# Patient Record
Sex: MALE | Race: AMERICAN INDIAN OR ALASKA NATIVE
[De-identification: names, ages, dates, MRNs, and addresses within clinical notes are randomized per-mention and may not be internally consistent; named-entity substitution may affect disease eponyms.]

---

## 2018-09-01 ENCOUNTER — HOSPITAL ENCOUNTER (EMERGENCY)
Dept: HOSPITAL 5 - ED | Age: 63
Discharge: HOME | End: 2018-09-01
Payer: COMMERCIAL

## 2018-09-01 VITALS — SYSTOLIC BLOOD PRESSURE: 126 MMHG | DIASTOLIC BLOOD PRESSURE: 63 MMHG

## 2018-09-01 DIAGNOSIS — B34.9: Primary | ICD-10-CM

## 2018-09-01 LAB
ALBUMIN SERPL-MCNC: 3.9 G/DL (ref 3.9–5)
ALT SERPL-CCNC: 15 UNITS/L (ref 7–56)
BASOPHILS # (AUTO): 0 K/MM3 (ref 0–0.1)
BASOPHILS NFR BLD AUTO: 0.3 % (ref 0–1.8)
BILIRUB UR QL STRIP: (no result)
BLOOD UR QL VISUAL: (no result)
BUN SERPL-MCNC: 9 MG/DL (ref 9–20)
BUN/CREAT SERPL: 10 %
CALCIUM SERPL-MCNC: 8.8 MG/DL (ref 8.4–10.2)
EOSINOPHIL # BLD AUTO: 0.2 K/MM3 (ref 0–0.4)
EOSINOPHIL NFR BLD AUTO: 2.3 % (ref 0–4.3)
HCT VFR BLD CALC: 40.8 % (ref 35.5–45.6)
HEMOLYSIS INDEX: 7
HGB BLD-MCNC: 13.3 GM/DL (ref 11.8–15.2)
LYMPHOCYTES # BLD AUTO: 0.8 K/MM3 (ref 1.2–5.4)
LYMPHOCYTES NFR BLD AUTO: 9.8 % (ref 13.4–35)
MCH RBC QN AUTO: 25 PG (ref 28–32)
MCHC RBC AUTO-ENTMCNC: 33 % (ref 32–34)
MCV RBC AUTO: 76 FL (ref 84–94)
MONOCYTES # (AUTO): 0.9 K/MM3 (ref 0–0.8)
MONOCYTES % (AUTO): 11.3 % (ref 0–7.3)
MUCOUS THREADS #/AREA URNS HPF: (no result) /HPF
PH UR STRIP: 5 [PH] (ref 5–7)
PLATELET # BLD: 223 K/MM3 (ref 140–440)
PROT UR STRIP-MCNC: (no result) MG/DL
RBC # BLD AUTO: 5.39 M/MM3 (ref 3.65–5.03)
RBC #/AREA URNS HPF: 1 /HPF (ref 0–6)
UROBILINOGEN UR-MCNC: < 2 MG/DL (ref ?–2)
WBC #/AREA URNS HPF: 1 /HPF (ref 0–6)

## 2018-09-01 PROCEDURE — 99283 EMERGENCY DEPT VISIT LOW MDM: CPT

## 2018-09-01 PROCEDURE — 36415 COLL VENOUS BLD VENIPUNCTURE: CPT

## 2018-09-01 PROCEDURE — 80053 COMPREHEN METABOLIC PANEL: CPT

## 2018-09-01 PROCEDURE — 85025 COMPLETE CBC W/AUTO DIFF WBC: CPT

## 2018-09-01 PROCEDURE — 81001 URINALYSIS AUTO W/SCOPE: CPT

## 2018-09-01 NOTE — EMERGENCY DEPARTMENT REPORT
ED General Adult HPI





- General


Chief complaint: Pain General


Stated complaint: BODY ACHE


Time Seen by Provider: 09/01/18 09:25


Source: patient


Mode of arrival: Ambulatory


Limitations: No Limitations





- History of Present Illness


Initial comments: 





Patient is a 62-year-old gentleman from [place] who has been in the [place] 

[place] for the past 3 months who is complaining of generalized body aches and 

chills for the past 2 days.  Patient states pain is 8 out of 10 in severity and 

is achy all over.  Patient denies any fevers nausea vomiting cough dysuria 

congestion diarrhea.  Patient states he thinks he was bitten by mosquitoes 

several days ago but is not concerned about malaria.


Severity scale (0 -10): 0





- Related Data


 Previous Rx's











 Medication  Instructions  Recorded  Last Taken  Type


 


Ibuprofen [Motrin] 600 mg PO Q8H PRN #20 tablet 09/01/18 Unknown Rx


 


traMADol [Ultram] 50 mg PO Q6HR PRN #12 tablet 09/01/18 Unknown Rx











 Allergies











Allergy/AdvReac Type Severity Reaction Status Date / Time


 


No Known Allergies Allergy   Unverified 09/01/18 04:50














ED Review of Systems


ROS: 


Stated complaint: BODY ACHE


Other details as noted in HPI





Comment: All other systems reviewed and negative





ED Past Medical Hx





- Past Medical History


Previous Medical History?: No





- Surgical History


Past Surgical History?: No





- Social History


Smoking Status: Never Smoker


Substance Use Type: None





- Medications


Home Medications: 


 Home Medications











 Medication  Instructions  Recorded  Confirmed  Last Taken  Type


 


Ibuprofen [Motrin] 600 mg PO Q8H PRN #20 tablet 09/01/18  Unknown Rx


 


traMADol [Ultram] 50 mg PO Q6HR PRN #12 tablet 09/01/18  Unknown Rx














ED Physical Exam





- General


Limitations: Language Barrier (patient does speak some partial English as well 

as [place].  His daughter is fluent in English.)


General appearance: alert, in no apparent distress





- Head


Head exam: Present: atraumatic, normocephalic





- Eye


Eye exam: Present: normal appearance





- ENT


ENT exam: Present: mucous membranes moist





- Neck


Neck exam: Present: normal inspection





- Respiratory


Respiratory exam: Present: normal lung sounds bilaterally.  Absent: respiratory 

distress, wheezes, rales, rhonchi





- Cardiovascular


Cardiovascular Exam: Present: regular rate, normal rhythm.  Absent: systolic 

murmur, diastolic murmur, rubs, gallop





- GI/Abdominal


GI/Abdominal exam: Present: soft, normal bowel sounds.  Absent: distended, 

tenderness, guarding, rebound





- Rectal


Rectal exam: Present: deferred





- Extremities Exam


Extremities exam: Present: normal inspection





- Back Exam


Back exam: Present: normal inspection





- Neurological Exam


Neurological exam: Present: alert, oriented X3





- Psychiatric


Psychiatric exam: Present: normal affect, normal mood





- Skin


Skin exam: Present: warm, dry, intact, normal color.  Absent: rash





ED Course





 Vital Signs











  09/01/18





  04:40


 


Temperature 97.6 F


 


Pulse Rate 87


 


Respiratory 17





Rate 


 


Blood Pressure 126/63


 


O2 Sat by Pulse 99





Oximetry 














ED Medical Decision Making





- Lab Data


Result diagrams: 


 09/01/18 05:14





 09/01/18 05:14








 Lab Results











  09/01/18 09/01/18 09/01/18 Range/Units





  05:14 05:14 Unknown 


 


WBC  7.8    (4.5-11.0)  K/mm3


 


RBC  5.39 H    (3.65-5.03)  M/mm3


 


Hgb  13.3    (11.8-15.2)  gm/dl


 


Hct  40.8    (35.5-45.6)  %


 


MCV  76 L    (84-94)  fl


 


MCH  25 L    (28-32)  pg


 


MCHC  33    (32-34)  %


 


RDW  15.7 H    (13.2-15.2)  %


 


Plt Count  223    (140-440)  K/mm3


 


Lymph % (Auto)  9.8 L    (13.4-35.0)  %


 


Mono % (Auto)  11.3 H    (0.0-7.3)  %


 


Eos % (Auto)  2.3    (0.0-4.3)  %


 


Baso % (Auto)  0.3    (0.0-1.8)  %


 


Lymph #  0.8 L    (1.2-5.4)  K/mm3


 


Mono #  0.9 H    (0.0-0.8)  K/mm3


 


Eos #  0.2    (0.0-0.4)  K/mm3


 


Baso #  0.0    (0.0-0.1)  K/mm3


 


Seg Neutrophils %  76.3 H    (40.0-70.0)  %


 


Seg Neutrophils #  6.0    (1.8-7.7)  K/mm3


 


Sodium   136 L   (137-145)  mmol/L


 


Potassium   4.0   (3.6-5.0)  mmol/L


 


Chloride   99.5   ()  mmol/L


 


Carbon Dioxide   27   (22-30)  mmol/L


 


Anion Gap   14   mmol/L


 


BUN   9   (9-20)  mg/dL


 


Creatinine   0.9   (0.8-1.5)  mg/dL


 


Estimated GFR   > 60   ml/min


 


BUN/Creatinine Ratio   10   %


 


Glucose   122 H   ()  mg/dL


 


Calcium   8.8   (8.4-10.2)  mg/dL


 


Total Bilirubin   0.70   (0.1-1.2)  mg/dL


 


AST   17   (5-40)  units/L


 


ALT   15   (7-56)  units/L


 


Alkaline Phosphatase   74   ()  units/L


 


Total Protein   7.0   (6.3-8.2)  g/dL


 


Albumin   3.9   (3.9-5)  g/dL


 


Albumin/Globulin Ratio   1.3   %


 


Urine Color    Yellow  (Yellow)  


 


Urine Turbidity    Clear  (Clear)  


 


Urine pH    5.0  (5.0-7.0)  


 


Ur Specific Gravity    1.018  (1.003-1.030)  


 


Urine Protein    <15 mg/dl  (Negative)  mg/dL


 


Urine Glucose (UA)    Neg  (Negative)  mg/dL


 


Urine Ketones    Neg  (Negative)  mg/dL


 


Urine Blood    Neg  (Negative)  


 


Urine Nitrite    Neg  (Negative)  


 


Urine Bilirubin    Neg  (Negative)  


 


Urine Urobilinogen    < 2.0  (<2.0)  mg/dL


 


Ur Leukocyte Esterase    Neg  (Negative)  


 


Urine WBC (Auto)    1.0  (0.0-6.0)  /HPF


 


Urine RBC (Auto)    1.0  (0.0-6.0)  /HPF


 


Urine Mucus    Few  /HPF














- Medical Decision Making





Patient's A studies are within normal limits.  Note patient most likely has a 

early viral illness causing myalgias and the patient be given meds for 

symptomatic relief and discharged home.


Critical care attestation.: 


If time is entered above; I have spent that time in minutes in the direct care 

of this critically ill patient, excluding procedure time.








ED Disposition


Clinical Impression: 


 Viral illness, Myalgia





Disposition: DC-01 TO HOME OR SELFCARE


Is pt being admited?: No


Does the pt Need Aspirin: No


Condition: Stable


Instructions:  Musculoskeletal Pain (ED), Viral Syndrome (ED)


Referrals: 


[facility] [Other] - 3-5 Days


Time of Disposition: 09:49